# Patient Record
Sex: FEMALE | Race: WHITE | NOT HISPANIC OR LATINO
[De-identification: names, ages, dates, MRNs, and addresses within clinical notes are randomized per-mention and may not be internally consistent; named-entity substitution may affect disease eponyms.]

---

## 2019-09-23 ENCOUNTER — APPOINTMENT (OUTPATIENT)
Dept: PAIN MANAGEMENT | Facility: CLINIC | Age: 69
End: 2019-09-23
Payer: MEDICARE

## 2019-09-23 VITALS
HEIGHT: 63 IN | SYSTOLIC BLOOD PRESSURE: 130 MMHG | WEIGHT: 203 LBS | DIASTOLIC BLOOD PRESSURE: 70 MMHG | BODY MASS INDEX: 35.97 KG/M2

## 2019-09-23 DIAGNOSIS — Z86.59 PERSONAL HISTORY OF OTHER MENTAL AND BEHAVIORAL DISORDERS: ICD-10-CM

## 2019-09-23 DIAGNOSIS — M25.511 PAIN IN RIGHT SHOULDER: ICD-10-CM

## 2019-09-23 DIAGNOSIS — Z87.39 PERSONAL HISTORY OF OTHER DISEASES OF THE MUSCULOSKELETAL SYSTEM AND CONNECTIVE TISSUE: ICD-10-CM

## 2019-09-23 DIAGNOSIS — Z86.39 PERSONAL HISTORY OF OTHER ENDOCRINE, NUTRITIONAL AND METABOLIC DISEASE: ICD-10-CM

## 2019-09-23 DIAGNOSIS — B02.29 OTHER POSTHERPETIC NERVOUS SYSTEM INVOLVEMENT: ICD-10-CM

## 2019-09-23 DIAGNOSIS — Z86.69 PERSONAL HISTORY OF OTHER DISEASES OF THE NERVOUS SYSTEM AND SENSE ORGANS: ICD-10-CM

## 2019-09-23 DIAGNOSIS — M25.569 PAIN IN UNSPECIFIED KNEE: ICD-10-CM

## 2019-09-23 DIAGNOSIS — M25.512 PAIN IN RIGHT SHOULDER: ICD-10-CM

## 2019-09-23 DIAGNOSIS — Z86.79 PERSONAL HISTORY OF OTHER DISEASES OF THE CIRCULATORY SYSTEM: ICD-10-CM

## 2019-09-23 PROBLEM — Z00.00 ENCOUNTER FOR PREVENTIVE HEALTH EXAMINATION: Status: ACTIVE | Noted: 2019-09-23

## 2019-09-23 PROCEDURE — 99204 OFFICE O/P NEW MOD 45 MIN: CPT

## 2019-09-23 RX ORDER — ASPIRIN 81 MG
81 TABLET, DELAYED RELEASE (ENTERIC COATED) ORAL
Refills: 0 | Status: ACTIVE | COMMUNITY

## 2019-09-23 RX ORDER — ATORVASTATIN CALCIUM 40 MG/1
40 TABLET, FILM COATED ORAL
Refills: 0 | Status: ACTIVE | COMMUNITY

## 2019-09-23 RX ORDER — QUETIAPINE 50 MG/1
50 TABLET, FILM COATED ORAL
Refills: 0 | Status: ACTIVE | COMMUNITY

## 2019-09-23 NOTE — ASSESSMENT
[FreeTextEntry1] : 69 yof s/p psoas tumor removal (2015) and bilateral laminectomeis (2017, 2019) presents w/ low back pain. Given the severity of her low back and radicular pain, recommend MRI of the lumbar spine with and without contrast. Patient will return to review imaging and plan for potential intervention, likely epidural steroid injection. Recommend against long-term opioid management. Continue HEP.

## 2019-09-23 NOTE — REVIEW OF SYSTEMS
[Feeling Tired] : fatigue [Decrease Hearing] : decrease hearing [Recent Weight Gain (___ Lbs)] : recent ~M [unfilled] lbs weight gain [Cough] : cough [SOB at rest] : shortness of breath at rest [Wheezing] : wheezing [Urinary Frequency] : urinary frequency [Urinary Urgency] : urinary urgency [Joint Pain] : joint pain [Back Pain] : back pain [Joint Stiffness] : joint stiffness [Numbness] : numbness [Anxiety] : anxiety [Depression] : depression [Negative] : Heme/Lymph

## 2019-09-23 NOTE — CONSULT LETTER
[Dear  ___] : Dear  [unfilled], [Courtesy Letter:] : I had the pleasure of seeing your patient, [unfilled], in my office today. [Please see my note below.] : Please see my note below. [Consult Closing:] : Thank you very much for allowing me to participate in the care of this patient.  If you have any questions, please do not hesitate to contact me. [Sincerely,] : Sincerely, [FreeTextEntry3] : Shay Zamora MD\par \par

## 2019-09-23 NOTE — HISTORY OF PRESENT ILLNESS
[Back Pain] : back pain [Joint Pain] : joint  [3] : a minimum pain level of 3/10 [7] : a maximum pain level of 7/10 [Sharp] : sharp [Dull] : dull [Laying] : laying [Shooting] : shooting [Walking] : walking [Medications] : medications [6] : 3. What number best describes how, during the past week, pain has interfered with your general activity? 6/10 pain [FreeTextEntry1] : 69 yof presents w/ low back pain. Her pain began in 2015 when she had a psoas tumor removed in her right leg. Following this she developed pain down her left leg and underwent a laminectomy in 2017. She then underwent a right-sided laminectomy in January of 2019. Since that time, she has had severe low back pain. Pain is 7/10 in intensity. Pain is worse with activity. There is weakness, numbness, and tingling. Pain is dull and sharp. She typically uses tramadol and Norco for pain relief. Physical therapy has been beneficial prior. She also has right knee pain. No relief with steroid shots, however, viscosupplementation did help. [FreeTextEntry7] : lower back ,shulders [de-identified] : numbness [FreeTextEntry2] : 18

## 2019-09-23 NOTE — PHYSICAL EXAM
[de-identified] : Constitutional: Well-developed, in no acute distress\par Eyes: Pupil- Right: normal, Left: normal\par Neck exam: Inspection normal\par Respiratory: Normal effort, speaking in full sentences\par Cardiovascular: Regular rate and rhythm\par Vascular: Dorsal pedis pulses normal and equal bilaterally\par Abdomen: Inspection normal, no distension\par Skin: Inspection normal, no bruising noted\par Musculoskeletal:\par Lumbar Spine:   Gait: Antalgic\par 		Inspection: Normal curvature, no abnormal kyphosis or scoliosis\par 		Facet loading: pain bilaterally\par 		Palpation:\par 			Lumbar and paraspinal muscles: pain bilaterally\par 			Sacroiliac joint: no pain\par 			Greater trochanter: no pain\par 		Muscle Strength:\par 		Iliopsoas: 5/5 bilaterally\par 		Quadriceps: 5/5 bilaterally\par 		Hamstrings: 5/5 bilaterally\par 		Tibialis anterior: 5/5 bilaterally\par 		Extensor hallucis longus: 5/5 bilaterally\par \par 		Sensation: normal and equal in bilateral lower extremities\par \par 		Reflexes:\par 			Patellar reflex: 2+ bilaterally\par 			Ankle jerk reflex: 2+ bilaterally\par 		\par 		Straight leg raise: negative bilaterally\par \par Extremity: no edema noted\par Neurological: Memory normal, AAO x 3, Cranial nerves II - XII grossly normal\par Psychiatric: Appropriate mood and affect, oriented to time, place, person, and situation\par \par \par

## 2019-09-30 ENCOUNTER — RESULT REVIEW (OUTPATIENT)
Age: 69
End: 2019-09-30

## 2019-10-03 ENCOUNTER — RESULT REVIEW (OUTPATIENT)
Age: 69
End: 2019-10-03

## 2019-10-03 ENCOUNTER — APPOINTMENT (OUTPATIENT)
Dept: PAIN MANAGEMENT | Facility: CLINIC | Age: 69
End: 2019-10-03
Payer: MEDICARE

## 2019-10-03 VITALS
BODY MASS INDEX: 35.97 KG/M2 | WEIGHT: 203 LBS | HEIGHT: 63 IN | SYSTOLIC BLOOD PRESSURE: 130 MMHG | DIASTOLIC BLOOD PRESSURE: 70 MMHG

## 2019-10-03 DIAGNOSIS — M79.10 MYALGIA, UNSPECIFIED SITE: ICD-10-CM

## 2019-10-03 DIAGNOSIS — M47.817 SPONDYLOSIS W/OUT MYELOPATHY OR RADICULOPATHY, LUMBOSACRAL REGION: ICD-10-CM

## 2019-10-03 DIAGNOSIS — M54.16 RADICULOPATHY, LUMBAR REGION: ICD-10-CM

## 2019-10-03 PROCEDURE — 99214 OFFICE O/P EST MOD 30 MIN: CPT

## 2019-10-03 NOTE — PHYSICAL EXAM
[de-identified] : Constitutional: Well-developed, in no acute distress\par Eyes: Pupil- Right: normal, Left: normal\par Neck exam: Inspection normal\par Respiratory: Normal effort, speaking in full sentences\par Cardiovascular: Regular rate and rhythm\par Vascular: Dorsal pedis pulses normal and equal bilaterally\par Abdomen: Inspection normal, no distension\par Skin: Inspection normal, no bruising noted\par Musculoskeletal:\par Lumbar Spine:   Gait: Antalgic\par 		Inspection: Normal curvature, no abnormal kyphosis or scoliosis\par 		Facet loading: pain bilaterally\par 		Palpation:\par 			Lumbar and paraspinal muscles: pain bilaterally\par 			Sacroiliac joint: no pain\par 			Greater trochanter: no pain\par 		Muscle Strength:\par 		Iliopsoas: 5/5 bilaterally\par 		Quadriceps: 5/5 bilaterally\par 		Hamstrings: 5/5 bilaterally\par 		Tibialis anterior: 5/5 bilaterally\par 		Extensor hallucis longus: 5/5 bilaterally\par \par 		Sensation: normal and equal in bilateral lower extremities\par \par 		Reflexes:\par 			Patellar reflex: 2+ bilaterally\par 			Ankle jerk reflex: 2+ bilaterally\par 		\par 		Straight leg raise: negative bilaterally\par \par Extremity: no edema noted\par Neurological: Memory normal, AAO x 3, Cranial nerves II - XII grossly normal\par Psychiatric: Appropriate mood and affect, oriented to time, place, person, and situation\par \par \par

## 2019-10-03 NOTE — DATA REVIEWED
[No studies available for review at this time.] : No studies available for review at this time. [FreeTextEntry1] : MRI LUMBAR SPINE [facility] (10/01/19):\par \par \par  There have been prior laminectomies at L4-5 and L5-S1. There are postoperative changes in the\par dorsal soft tissues including a small fluid collection in the dorsal subcutaneous is fat measuring\par approximately 1.7 cm AP x 1.7 cm transverse x 10.9 cm craniocaudal and additional small fluid\par collection at the L4 laminectomy measuring 1.2 cm transverse by 1 AP x 1.9 cm craniocaudal. These\par are likely seromas, unless there is concern for infection.\par \par  The lumbar curvature and alignment is grossly maintained. The marrow signal is within normal limits\par with no focal marrow replacing lesion identified. The vertebral body heights are maintained and\par there is no evidence of acute fracture or subluxation. There is prominent endplate edema at L4-5\par with no abnormal signal within the intervertebral disc or adjacent paraspinal soft tissue edema,\par likely degenerative or postsurgical in etiology. Otherwise no abnormal vertebral edema is\par appreciated. The visualized anterior paraspinal soft tissues demonstrate no acute abnormalities. The\par right psoas muscle is atrophic. Renal cysts are noted.\par \par  The conus medullaris terminates at L1 and the thecal sac terminates at S2. No abnormal signal or\par enhancement is identified in the visualized spinal cord.\par \par  The visualized lower thoracic spine at the level of T10-11, there is prominent facet hypertrophy\par resulting in mild central canal stenosis and encroachment upon the dorsal spinal cord.\par \par  T12-L1: No significant disc bulge or herniation. No significant central canal, subarticular, or\par foraminal stenosis.\par \par  L1-2: Mild disc bulge and mild facet/ligamentous hypertrophy. No significant central canal,\par subarticular, or foraminal stenosis.\par \par  L2-3: Mild disc bulge and mild facet/ligamentous hypertrophy. No significant central canal or\par subarticular stenosis. Mild/moderate left foraminal stenosis and no significant right foraminal\par stenosis.\par \par  L3-4: Mild disc bulge mild facet/ligamentous hypertrophy. No significant central canal,\par subarticular, or foraminal stenosis. Just lateral to the right neural foramen, there are small\par indeterminate structures which demonstrates susceptibility artifact suggesting some component of\par metal, possibly surgical clips and adjacent scar tissue related to the patient's history of\par schwannoma resection. The right psoas muscle in this region is atrophic. Correlate with surgical\par history.\par \par  L4-5: Status post laminectomy. Central disc extrusion superimposed on a mild disc bulge. Moderate\par facet hypertrophy. Severe central canal and bilateral subarticular stenosis with impingement of the\par cauda equina. Moderate/severe bilateral foraminal stenosis.\par \par  L5-S1: Status post laminectomy. Complete loss of disc space with apparent partial degenerative\par fusion of L5-S1. Mild circumferential disc/osteophyte and mild facet arthropathy. No significant\par central canal stenosis. Moderate right subarticular stenosis and mild left subarticular stenosis.\par Moderate bilateral foraminal stenosis.\par \par \par \par  IMPRESSION:\par \par  Status post prior laminectomy at L4-5 and L5-S1. Postoperative changes in the dorsal soft tissues\par including 2 small fluid collections which are compatible with seromas, unless there is clinical\par concern for infection. Prominent endplate edema at L4-5 has an appearance suggestive of degenerative\par or postoperative etiology, however the possibility of an early osteomyelitis cannot be completely\par excluded.\par \par  At L4-5, a central disc herniation results in severe central canal stenosis and impingement of the\par cauda equina. No other significant central canal stenosis. Varying degrees of foraminal stenosis as\par above.\par \par  Just lateral to the right L3-4 neural foramen, there are small indeterminate structures with signal\par characteristics suggesting a component of metal. The right psoas muscle is completely atrophic.\par Finding may be related to the history of prior schwannoma resection or other surgery. Correlation\par with surgical history and prior outside imaging is recommended.\par \par

## 2019-10-03 NOTE — REVIEW OF SYSTEMS
[Feeling Tired] : fatigue [Recent Weight Gain (___ Lbs)] : recent ~M [unfilled] lbs weight gain [Decrease Hearing] : decrease hearing [SOB at rest] : shortness of breath at rest [Cough] : cough [Wheezing] : wheezing [Urinary Frequency] : urinary frequency [Urinary Urgency] : urinary urgency [Back Pain] : back pain [Joint Pain] : joint pain [Joint Stiffness] : joint stiffness [Numbness] : numbness [Anxiety] : anxiety [Depression] : depression [Negative] : Heme/Lymph

## 2019-10-03 NOTE — ASSESSMENT
[FreeTextEntry1] : 69 yof s/p psoas tumor removal (2015) and bilateral laminectomies (2017, 2019) presents w/ low back pain. I have personally reviewed the patient's MRI in detail and discussed it with them which is significant for severe canal stenosis at L4-L5. Given incidental findings, x-ray lumbar spine to rule out metal. Refer to spine surgery for evaluation, given severity of symptoms. If not considered a surgical candidate, consider MEMO. Start cyclobenzaprine 5 mg TID prn for pain. Continue gabapentin 1200 mg TID, which she has been on for years.\par

## 2019-10-03 NOTE — HISTORY OF PRESENT ILLNESS
[Back Pain] : back pain [Joint Pain] : joint  [3] : a minimum pain level of 3/10 [7] : a maximum pain level of 7/10 [Sharp] : sharp [Dull] : dull [Shooting] : shooting [Laying] : laying [Walking] : walking [Medications] : medications [6] : 3. What number best describes how, during the past week, pain has interfered with your general activity? 6/10 pain [FreeTextEntry1] : As per my note dated 09/23/19: "69 yof presents w/ low back pain. Her pain began in 2015 when she had a psoas tumor removed in her right leg. Following this she developed pain down her left leg and underwent a laminectomy in 2017. She then underwent a right-sided laminectomy in January of 2019. Since that time, she has had severe low back pain. Pain is 7/10 in intensity. Pain is worse with activity. There is weakness, numbness, and tingling. Pain is dull and sharp. She typically uses tramadol and Norco for pain relief. Physical therapy has been beneficial prior. She also has right knee pain. No relief with steroid shots, however, viscosupplementation did help."\par \par Pt returns for follow-up today, 10/03/19. She returns to review recent MRI of the lumbar spine. Pain remains severe. Pain is in the back and radiates into the legs. Quality of life remains impaired. [FreeTextEntry7] : lower back ,shulders [de-identified] : numbness [FreeTextEntry2] : 18

## 2019-11-26 ENCOUNTER — APPOINTMENT (OUTPATIENT)
Dept: HEART AND VASCULAR | Facility: CLINIC | Age: 69
End: 2019-11-26
Payer: MEDICARE

## 2019-11-26 ENCOUNTER — NON-APPOINTMENT (OUTPATIENT)
Age: 69
End: 2019-11-26

## 2019-11-26 VITALS
SYSTOLIC BLOOD PRESSURE: 122 MMHG | RESPIRATION RATE: 16 BRPM | DIASTOLIC BLOOD PRESSURE: 70 MMHG | BODY MASS INDEX: 34.38 KG/M2 | HEIGHT: 63 IN | HEART RATE: 60 BPM | WEIGHT: 194 LBS

## 2019-11-26 DIAGNOSIS — I25.2 OLD MYOCARDIAL INFARCTION: ICD-10-CM

## 2019-11-26 PROCEDURE — 93000 ELECTROCARDIOGRAM COMPLETE: CPT

## 2019-11-26 PROCEDURE — 99204 OFFICE O/P NEW MOD 45 MIN: CPT

## 2019-11-26 RX ORDER — ALBUTEROL 90 MCG
AEROSOL (GRAM) INHALATION
Refills: 0 | Status: DISCONTINUED | COMMUNITY
End: 2019-11-26

## 2019-11-26 RX ORDER — LORATADINE 10 MG/1
10 CAPSULE, LIQUID FILLED ORAL
Refills: 0 | Status: DISCONTINUED | COMMUNITY
End: 2019-11-26

## 2019-11-26 RX ORDER — METOPROLOL TARTRATE 50 MG/1
50 TABLET, FILM COATED ORAL
Qty: 180 | Refills: 3 | Status: ACTIVE | COMMUNITY

## 2019-11-26 RX ORDER — LISINOPRIL 40 MG/1
40 TABLET ORAL
Refills: 0 | Status: ACTIVE | COMMUNITY

## 2019-11-26 RX ORDER — NITROGLYCERIN 400 UG/1
SPRAY ORAL
Refills: 0 | Status: DISCONTINUED | COMMUNITY
End: 2019-11-26

## 2019-11-26 RX ORDER — FLUTICASONE PROPIONATE 50 UG/1
50 SPRAY, METERED NASAL
Refills: 0 | Status: ACTIVE | COMMUNITY

## 2019-11-26 RX ORDER — OXYCODONE HYDROCHLORIDE AND ACETAMINOPHEN 5; 325 MG/1; MG/1
5-325 TABLET ORAL
Refills: 0 | Status: DISCONTINUED | COMMUNITY
End: 2019-11-26

## 2019-11-26 RX ORDER — IBUPROFEN 200 MG/1
CAPSULE, LIQUID FILLED ORAL
Refills: 0 | Status: DISCONTINUED | COMMUNITY
End: 2019-11-26

## 2019-11-26 RX ORDER — CYCLOBENZAPRINE HYDROCHLORIDE 5 MG/1
5 TABLET, FILM COATED ORAL 3 TIMES DAILY
Qty: 90 | Refills: 1 | Status: DISCONTINUED | COMMUNITY
Start: 2019-10-03 | End: 2019-11-26

## 2019-11-26 NOTE — PHYSICAL EXAM
[General Appearance - Well Developed] : well developed [Well Groomed] : well groomed [Normal Appearance] : normal appearance [No Deformities] : no deformities [General Appearance - Well Nourished] : well nourished [General Appearance - In No Acute Distress] : no acute distress [No Oral Pallor] : no oral pallor [Normal Oral Mucosa] : normal oral mucosa [Heart Sounds] : normal S1 and S2 [Heart Rate And Rhythm] : heart rate and rhythm were normal [No Oral Cyanosis] : no oral cyanosis [Murmurs] : no murmurs present [Auscultation Breath Sounds / Voice Sounds] : lungs were clear to auscultation bilaterally [Exaggerated Use Of Accessory Muscles For Inspiration] : no accessory muscle use [Respiration, Rhythm And Depth] : normal respiratory rhythm and effort [Abdomen Tenderness] : non-tender [Abdomen Soft] : soft [Nail Clubbing] : no clubbing of the fingernails [Cyanosis, Localized] : no localized cyanosis [Abdomen Mass (___ Cm)] : no abdominal mass palpated [Oriented To Time, Place, And Person] : oriented to person, place, and time [Affect] : the affect was normal [] : no ischemic changes [Petechial Hemorrhages (___cm)] : no petechial hemorrhages [No Anxiety] : not feeling anxious [Mood] : the mood was normal

## 2019-11-26 NOTE — REASON FOR VISIT
[Initial Evaluation] : an initial evaluation of [FreeTextEntry1] : 69 year old F with history of HTN, HL, history of benign Schwannoma, reported history of MI (2012) as per patient with 4 stents in [place], has not seen cardiologist since 2017. She just relocated to [place] from [place] and here to establish cardiology care. She has been having shoulder pain associated with hot flashes that occur roughly once a month. She denies dyspnea, orthopnea, PND, edema. She walks with a walker. Has not had stress testing/echocardiogram in many years. Recent bloodwork done within the last 3 months. \par \par EKG today NSR at 63 bpm without significant STT abnormalities. \par \par

## 2019-11-26 NOTE — DISCUSSION/SUMMARY
[Hyperlipidemia] : hyperlipidemia [Diet Modification] : diet modification [Hypertension] : hypertension [Exercise] : exercise [Responding to Treatment] : responding to treatment [Stable] : stable [Sodium Restriction] : sodium restriction [de-identified] : history of PCI in 2012 post MI.  [de-identified] : Dobutamine Stress Echo, as patient has chronic back pain and unable to walk.  [de-identified] : on Atorvastatin 40 mg PO daily [de-identified] : on Amlodipine 5 mg PO daily and Lisinopril 40 mg PO daily [FreeTextEntry4] : Will obtain records from [place] - Dr. Linares. Will obtain recent labs from within the past 3 months done at [facility].  [FreeTextEntry3] : after testing completed

## 2019-12-31 ENCOUNTER — APPOINTMENT (OUTPATIENT)
Dept: HEART AND VASCULAR | Facility: CLINIC | Age: 69
End: 2019-12-31
Payer: MEDICARE

## 2019-12-31 VITALS
SYSTOLIC BLOOD PRESSURE: 108 MMHG | BODY MASS INDEX: 34.38 KG/M2 | DIASTOLIC BLOOD PRESSURE: 60 MMHG | WEIGHT: 194 LBS | HEIGHT: 63 IN | HEART RATE: 66 BPM

## 2019-12-31 PROCEDURE — 93325 DOPPLER ECHO COLOR FLOW MAPG: CPT

## 2019-12-31 PROCEDURE — 93320 DOPPLER ECHO COMPLETE: CPT

## 2019-12-31 PROCEDURE — 93351 STRESS TTE COMPLETE: CPT

## 2020-01-02 ENCOUNTER — RESULT REVIEW (OUTPATIENT)
Age: 70
End: 2020-01-02

## 2020-01-14 ENCOUNTER — APPOINTMENT (OUTPATIENT)
Dept: HEART AND VASCULAR | Facility: CLINIC | Age: 70
End: 2020-01-14
Payer: MEDICARE

## 2020-01-14 VITALS
HEIGHT: 63 IN | RESPIRATION RATE: 14 BRPM | BODY MASS INDEX: 30.83 KG/M2 | SYSTOLIC BLOOD PRESSURE: 114 MMHG | DIASTOLIC BLOOD PRESSURE: 70 MMHG | HEART RATE: 68 BPM | WEIGHT: 174 LBS

## 2020-01-14 PROCEDURE — 99214 OFFICE O/P EST MOD 30 MIN: CPT

## 2020-01-14 NOTE — DISCUSSION/SUMMARY
[Hyperlipidemia] : hyperlipidemia [Diet Modification] : diet modification [Exercise] : exercise [Hypertension] : hypertension [Stable] : stable [Sodium Restriction] : sodium restriction [Responding to Treatment] : responding to treatment [___ Week(s)] : [unfilled] week(s) [de-identified] : history of PCI in 2012 post MI.  [de-identified] : Dobutamine Stress Echo, as patient has chronic back pain and unable to walk.  [de-identified] : on Atorvastatin 40 mg PO daily [FreeTextEntry4] : check labs today [de-identified] : on Amlodipine 5 mg PO daily and Lisinopril 40 mg PO daily. To check BP log at home and see if we can adjust meds

## 2020-01-14 NOTE — REASON FOR VISIT
[Initial Evaluation] : an initial evaluation of [FreeTextEntry1] : 69 year old F with history of HTN, HL, history of benign Schwannoma, reported history of MI (2012) as per patient with 4 stents in [place] here for followup after recent testing. She denies chest pain, dyspnea, orthopnea, PND, edema. She has chronic LBP. She has lost 20 lbs and inquires about lowering BP meds. \par \par DSE 12/31/2019: No EKG changes, Occasional PVCs, Normal stress echo\par Resting echo Trace MR; LVEF 55%; Trace TR, PASP 10 mm Hg. Mild cLVH (1.4 cm). \par \par EKG 12/2019 NSR at 63 bpm without significant STT abnormalities. \par \par

## 2020-01-14 NOTE — PHYSICAL EXAM
[General Appearance - Well Developed] : well developed [Normal Appearance] : normal appearance [Well Groomed] : well groomed [General Appearance - Well Nourished] : well nourished [No Deformities] : no deformities [General Appearance - In No Acute Distress] : no acute distress [Normal Oral Mucosa] : normal oral mucosa [No Oral Cyanosis] : no oral cyanosis [No Oral Pallor] : no oral pallor [Respiration, Rhythm And Depth] : normal respiratory rhythm and effort [Exaggerated Use Of Accessory Muscles For Inspiration] : no accessory muscle use [Auscultation Breath Sounds / Voice Sounds] : lungs were clear to auscultation bilaterally [Heart Rate And Rhythm] : heart rate and rhythm were normal [Heart Sounds] : normal S1 and S2 [Murmurs] : no murmurs present [Abdomen Soft] : soft [Abdomen Tenderness] : non-tender [Abdomen Mass (___ Cm)] : no abdominal mass palpated [Nail Clubbing] : no clubbing of the fingernails [Cyanosis, Localized] : no localized cyanosis [Oriented To Time, Place, And Person] : oriented to person, place, and time [Petechial Hemorrhages (___cm)] : no petechial hemorrhages [] : no ischemic changes [Mood] : the mood was normal [Affect] : the affect was normal [No Anxiety] : not feeling anxious

## 2020-01-15 ENCOUNTER — RESULT REVIEW (OUTPATIENT)
Age: 70
End: 2020-01-15

## 2020-01-15 LAB
ALBUMIN SERPL ELPH-MCNC: 4 G/DL
ALP BLD-CCNC: 119 U/L
ALT SERPL-CCNC: 15 U/L
ANION GAP SERPL CALC-SCNC: 13 MMOL/L
AST SERPL-CCNC: 27 U/L
BASOPHILS # BLD AUTO: 0.06 K/UL
BASOPHILS NFR BLD AUTO: 0.6 %
BILIRUB SERPL-MCNC: 0.3 MG/DL
BUN SERPL-MCNC: 22 MG/DL
CALCIUM SERPL-MCNC: 10.1 MG/DL
CHLORIDE SERPL-SCNC: 108 MMOL/L
CHOLEST SERPL-MCNC: 178 MG/DL
CHOLEST/HDLC SERPL: 4.7 RATIO
CO2 SERPL-SCNC: 21 MMOL/L
CREAT SERPL-MCNC: 0.86 MG/DL
EOSINOPHIL # BLD AUTO: 0.39 K/UL
EOSINOPHIL NFR BLD AUTO: 4 %
GLUCOSE SERPL-MCNC: 103 MG/DL
HCT VFR BLD CALC: 43.3 %
HDLC SERPL-MCNC: 38 MG/DL
HGB BLD-MCNC: 13 G/DL
IMM GRANULOCYTES NFR BLD AUTO: 0.3 %
LDLC SERPL CALC-MCNC: 109 MG/DL
LYMPHOCYTES # BLD AUTO: 2 K/UL
LYMPHOCYTES NFR BLD AUTO: 20.6 %
MAN DIFF?: NORMAL
MCHC RBC-ENTMCNC: 30 GM/DL
MCHC RBC-ENTMCNC: 30.2 PG
MCV RBC AUTO: 100.7 FL
MONOCYTES # BLD AUTO: 1.16 K/UL
MONOCYTES NFR BLD AUTO: 11.9 %
NEUTROPHILS # BLD AUTO: 6.07 K/UL
NEUTROPHILS NFR BLD AUTO: 62.6 %
PLATELET # BLD AUTO: 248 K/UL
POTASSIUM SERPL-SCNC: 4.5 MMOL/L
PROT SERPL-MCNC: 7.5 G/DL
RBC # BLD: 4.3 M/UL
RBC # FLD: 13.9 %
SODIUM SERPL-SCNC: 142 MMOL/L
TRIGL SERPL-MCNC: 153 MG/DL
WBC # FLD AUTO: 9.71 K/UL

## 2020-02-11 ENCOUNTER — APPOINTMENT (OUTPATIENT)
Dept: HEART AND VASCULAR | Facility: CLINIC | Age: 70
End: 2020-02-11
Payer: MEDICARE

## 2020-02-11 VITALS
DIASTOLIC BLOOD PRESSURE: 78 MMHG | BODY MASS INDEX: 32.96 KG/M2 | WEIGHT: 186 LBS | RESPIRATION RATE: 14 BRPM | HEART RATE: 62 BPM | SYSTOLIC BLOOD PRESSURE: 130 MMHG | HEIGHT: 63 IN

## 2020-02-11 DIAGNOSIS — I10 ESSENTIAL (PRIMARY) HYPERTENSION: ICD-10-CM

## 2020-02-11 DIAGNOSIS — R73.03 PREDIABETES.: ICD-10-CM

## 2020-02-11 PROCEDURE — 99214 OFFICE O/P EST MOD 30 MIN: CPT

## 2020-02-11 NOTE — REASON FOR VISIT
[Initial Evaluation] : an initial evaluation of [FreeTextEntry1] : 69 year old F with history of HTN, HL, history of benign Schwannoma, prediabetes reported history of MI (2012) as per patient with 4 stents in [place] here for followup. She denies chest pain, dyspnea, orthopnea, PND, edema. BP readings at home reviewed. \par \par Labs today: ; ; HDL 38; Total chol 178; CMP and CBC WNL\par \par DSE 12/31/2019: No EKG changes, Occasional PVCs, Normal stress echo\par Resting echo Trace MR; LVEF 55%; Trace TR, PASP 10 mm Hg. Mild cLVH (1.4 cm). \par \par EKG 12/2019 NSR at 63 bpm without significant STT abnormalities. \par \par

## 2020-02-11 NOTE — PHYSICAL EXAM
[General Appearance - Well Developed] : well developed [Normal Appearance] : normal appearance [Well Groomed] : well groomed [No Deformities] : no deformities [General Appearance - Well Nourished] : well nourished [General Appearance - In No Acute Distress] : no acute distress [Normal Oral Mucosa] : normal oral mucosa [No Oral Pallor] : no oral pallor [No Oral Cyanosis] : no oral cyanosis [Respiration, Rhythm And Depth] : normal respiratory rhythm and effort [Exaggerated Use Of Accessory Muscles For Inspiration] : no accessory muscle use [Auscultation Breath Sounds / Voice Sounds] : lungs were clear to auscultation bilaterally [Heart Rate And Rhythm] : heart rate and rhythm were normal [Murmurs] : no murmurs present [Heart Sounds] : normal S1 and S2 [Abdomen Tenderness] : non-tender [Abdomen Soft] : soft [Abdomen Mass (___ Cm)] : no abdominal mass palpated [Nail Clubbing] : no clubbing of the fingernails [Cyanosis, Localized] : no localized cyanosis [] : no ischemic changes [Petechial Hemorrhages (___cm)] : no petechial hemorrhages [Oriented To Time, Place, And Person] : oriented to person, place, and time [Affect] : the affect was normal [Mood] : the mood was normal [No Anxiety] : not feeling anxious

## 2020-02-11 NOTE — DISCUSSION/SUMMARY
[Hyperlipidemia] : hyperlipidemia [Diet Modification] : diet modification [Exercise] : exercise [Hypertension] : hypertension [Stable] : stable [Responding to Treatment] : responding to treatment [Sodium Restriction] : sodium restriction [___ Month(s)] : [unfilled] month(s) [de-identified] : history of PCI in 2012 post MI. Normal stress [de-identified] : Continue on Amlodipine 5 mg PO daily and Lisinopril 40 mg PO daily.  [de-identified] : on Atorvastatin 40 mg PO daily

## 2020-02-12 LAB
ESTIMATED AVERAGE GLUCOSE: 108 MG/DL
HBA1C MFR BLD HPLC: 5.4 %

## 2020-08-11 ENCOUNTER — APPOINTMENT (OUTPATIENT)
Dept: HEART AND VASCULAR | Facility: CLINIC | Age: 70
End: 2020-08-11
Payer: MEDICARE

## 2020-08-11 VITALS
BODY MASS INDEX: 32.6 KG/M2 | OXYGEN SATURATION: 95 % | DIASTOLIC BLOOD PRESSURE: 86 MMHG | SYSTOLIC BLOOD PRESSURE: 149 MMHG | HEIGHT: 63 IN | HEART RATE: 64 BPM | WEIGHT: 184 LBS | RESPIRATION RATE: 20 BRPM | TEMPERATURE: 98.1 F

## 2020-08-11 PROCEDURE — 99214 OFFICE O/P EST MOD 30 MIN: CPT

## 2020-08-11 RX ORDER — CITALOPRAM HYDROBROMIDE 20 MG/1
20 TABLET, FILM COATED ORAL
Refills: 0 | Status: ACTIVE | COMMUNITY

## 2020-08-11 RX ORDER — CITALOPRAM HYDROBROMIDE 40 MG/1
40 TABLET, FILM COATED ORAL DAILY
Refills: 0 | Status: DISCONTINUED | COMMUNITY
End: 2020-08-11

## 2020-08-11 RX ORDER — NITROGLYCERIN 0.4 MG/1
0.4 TABLET SUBLINGUAL
Qty: 30 | Refills: 1 | Status: ACTIVE | COMMUNITY
Start: 1900-01-01 | End: 1900-01-01

## 2020-08-11 NOTE — DISCUSSION/SUMMARY
[Hyperlipidemia] : hyperlipidemia [Diet Modification] : diet modification [Stable] : stable [Exercise] : exercise [Hypertension] : hypertension [Sodium Restriction] : sodium restriction [Holter Monitor] : a Holter monitor [de-identified] : history of PCI in 2012 post MI. Normal stress [de-identified] : on Atorvastatin 40 mg PO daily [de-identified] : elevated today [de-identified] : 2 weeks [FreeTextEntry3] : after testing completed.  [de-identified] : Continue on Amlodipine 5 mg PO daily and Lisinopril 40 mg PO daily. To keep BP log. If still elevated, will consider increasing Amlodipine to 10 mg PO daily

## 2020-08-11 NOTE — PHYSICAL EXAM
[Normal Appearance] : normal appearance [General Appearance - Well Developed] : well developed [General Appearance - Well Nourished] : well nourished [Well Groomed] : well groomed [No Deformities] : no deformities [General Appearance - In No Acute Distress] : no acute distress [No Oral Pallor] : no oral pallor [Normal Oral Mucosa] : normal oral mucosa [No Oral Cyanosis] : no oral cyanosis [Auscultation Breath Sounds / Voice Sounds] : lungs were clear to auscultation bilaterally [Respiration, Rhythm And Depth] : normal respiratory rhythm and effort [Exaggerated Use Of Accessory Muscles For Inspiration] : no accessory muscle use [Heart Rate And Rhythm] : heart rate and rhythm were normal [Heart Sounds] : normal S1 and S2 [Murmurs] : no murmurs present [Abdomen Soft] : soft [Abdomen Tenderness] : non-tender [Abdomen Mass (___ Cm)] : no abdominal mass palpated [] : no ischemic changes [Cyanosis, Localized] : no localized cyanosis [Petechial Hemorrhages (___cm)] : no petechial hemorrhages [Nail Clubbing] : no clubbing of the fingernails [Oriented To Time, Place, And Person] : oriented to person, place, and time [Affect] : the affect was normal [Mood] : the mood was normal [No Anxiety] : not feeling anxious

## 2020-08-11 NOTE — REASON FOR VISIT
[Initial Evaluation] : an initial evaluation of [FreeTextEntry1] : 70 year old F with history of HTN, HL, history of benign Schwannoma, prediabetes reported history of MI (2012) as per patient with 4 stents in [place] here for followup. Her BP is elevated today. She has not been keeping a BP log. Over the past 4 weeks, she has had two episodes of palpitations, that was accompanied by some uneasy feeling in the stomach and diaphoresis. The first episode subsided after having a BM. The second episode was after drinking a glass of champagne, and lasted approximately 30 minutes. She denies chest pain. However, has occasional shoulder pain (not with palpitations) and uneasy stomach feeling and states usually subsides with a BM. \par \par Labs 01/15/2020: ; ; HDL 38; Total chol 178; CMP and CBC WNL\par \par DSE 12/31/2019: No EKG changes, Occasional PVCs, Normal stress echo\par Resting echo Trace MR; LVEF 55%; Trace TR, PASP 10 mm Hg. Mild cLVH (1.4 cm). \par \par EKG 12/2019 NSR at 63 bpm without significant STT abnormalities. \par \par

## 2020-08-21 ENCOUNTER — APPOINTMENT (OUTPATIENT)
Dept: HEART AND VASCULAR | Facility: CLINIC | Age: 70
End: 2020-08-21
Payer: MEDICARE

## 2020-08-21 PROCEDURE — 0298T: CPT

## 2020-08-21 PROCEDURE — 0296T: CPT

## 2020-09-01 ENCOUNTER — APPOINTMENT (OUTPATIENT)
Dept: HEART AND VASCULAR | Facility: CLINIC | Age: 70
End: 2020-09-01

## 2020-10-27 ENCOUNTER — APPOINTMENT (OUTPATIENT)
Dept: HEART AND VASCULAR | Facility: CLINIC | Age: 70
End: 2020-10-27
Payer: MEDICARE

## 2020-10-27 ENCOUNTER — NON-APPOINTMENT (OUTPATIENT)
Age: 70
End: 2020-10-27

## 2020-10-27 VITALS
SYSTOLIC BLOOD PRESSURE: 109 MMHG | BODY MASS INDEX: 32.96 KG/M2 | OXYGEN SATURATION: 98 % | HEART RATE: 76 BPM | RESPIRATION RATE: 16 BRPM | DIASTOLIC BLOOD PRESSURE: 66 MMHG | WEIGHT: 186 LBS | HEIGHT: 63 IN | TEMPERATURE: 97.7 F

## 2020-10-27 DIAGNOSIS — R42 DIZZINESS AND GIDDINESS: ICD-10-CM

## 2020-10-27 DIAGNOSIS — R00.2 PALPITATIONS: ICD-10-CM

## 2020-10-27 PROCEDURE — 99214 OFFICE O/P EST MOD 30 MIN: CPT

## 2020-10-27 PROCEDURE — 0298T: CPT

## 2020-10-27 PROCEDURE — 0296T: CPT

## 2020-10-27 RX ORDER — AMLODIPINE BESYLATE 10 MG/1
10 TABLET ORAL
Refills: 0 | Status: ACTIVE | COMMUNITY

## 2020-10-27 RX ORDER — TRAZODONE HYDROCHLORIDE 50 MG/1
50 TABLET ORAL
Refills: 0 | Status: ACTIVE | COMMUNITY

## 2020-10-27 RX ORDER — AMLODIPINE BESYLATE 5 MG/1
5 TABLET ORAL
Refills: 0 | Status: DISCONTINUED | COMMUNITY
End: 2020-10-27

## 2020-10-27 NOTE — DISCUSSION/SUMMARY
[Hyperlipidemia] : hyperlipidemia [Stable] : stable [Diet Modification] : diet modification [Exercise] : exercise [Hypertension] : hypertension [Sodium Restriction] : sodium restriction [Holter Monitor] : a Holter monitor [de-identified] : history of PCI in 2012 post MI. Normal stress [de-identified] : on Atorvastatin 40 mg PO daily [de-identified] : normal [de-identified] : Amlodipine increased by PMD to 10 mg PO daily with better BP readings. c/w Lisinopril 40 mg PO daily. To keep BP log. If still elevated, will consider increasing Amlodipine to 10 mg PO daily [de-identified] : 2 weeks (Ziopatch placed today).  [FreeTextEntry4] : Will consider CCTA for further evaluation given diaphoresis, shoulder pain, dizziness and history of CAD.  [FreeTextEntry3] : after testing completed.

## 2020-10-27 NOTE — REASON FOR VISIT
[Initial Evaluation] : an initial evaluation of [FreeTextEntry1] : 70 year old F with history of HTN, HL, history of benign Schwannoma, prediabetes reported history of MI (2012) as per patient with 4 stents in [place] here for followup. She returns with complaints of  two more episodes of palpitations, that was accompanied by some uneasy feeling i and diaphoresis. 10/18 and 1023 - first one was 30 minutes long and the 10/23 episode was 4 hours. She admits to not drinking enough water. She also states she pushed herself to run errands. Holter from August reviewed with her, however she states she did not have palpitations at that time. Symptoms accompanied with hot feeling throughout body and shoulder pain. \par \par I have discussed with her at length importance of increasing water. Also given she is having more episodes of dizziness and palpitations, we will repeat Telemetry monitoring. I  have asked her to keep BP log as she has been. Of note, some changes to her Neuro/psych meds have been made. Cymbalta was started in place of Gabapentin  and Citalopram was decreased. EKG done today shows SR with normal QTC (<400 msec). \par \par Holter 2 week - Rare PVC/APC, Average HR 66 bpm SR. 9 beat SVT run - 1 time. \par Labs 01/15/2020: ; ; HDL 38; Total chol 178; CMP and CBC WNL\par \par DSE 12/31/2019: No EKG changes, Occasional PVCs, Normal stress echo\par Resting echo Trace MR; LVEF 55%; Trace TR, PASP 10 mm Hg. Mild cLVH (1.4 cm). \par \par EKG 12/2019 NSR at 63 bpm without significant STT abnormalities. \par \par

## 2020-10-28 PROBLEM — R00.2 PALPITATIONS: Status: ACTIVE | Noted: 2020-10-28

## 2020-10-28 PROBLEM — R42 DIZZINESS: Status: ACTIVE | Noted: 2020-10-28

## 2020-11-03 ENCOUNTER — APPOINTMENT (OUTPATIENT)
Dept: HEART AND VASCULAR | Facility: CLINIC | Age: 70
End: 2020-11-03
Payer: MEDICARE

## 2020-11-03 PROCEDURE — 36415 COLL VENOUS BLD VENIPUNCTURE: CPT

## 2020-12-17 ENCOUNTER — NON-APPOINTMENT (OUTPATIENT)
Age: 70
End: 2020-12-17

## 2020-12-28 ENCOUNTER — NON-APPOINTMENT (OUTPATIENT)
Age: 70
End: 2020-12-28

## 2021-01-25 ENCOUNTER — NON-APPOINTMENT (OUTPATIENT)
Age: 71
End: 2021-01-25

## 2021-10-06 PROBLEM — I10 ESSENTIAL HYPERTENSION: Status: ACTIVE | Noted: 2020-02-11

## 2021-10-26 ENCOUNTER — NON-APPOINTMENT (OUTPATIENT)
Age: 71
End: 2021-10-26

## 2021-10-26 ENCOUNTER — APPOINTMENT (OUTPATIENT)
Dept: HEART AND VASCULAR | Facility: CLINIC | Age: 71
End: 2021-10-26
Payer: MEDICARE

## 2021-10-26 VITALS
HEART RATE: 76 BPM | OXYGEN SATURATION: 99 % | RESPIRATION RATE: 16 BRPM | DIASTOLIC BLOOD PRESSURE: 56 MMHG | SYSTOLIC BLOOD PRESSURE: 116 MMHG | TEMPERATURE: 97.4 F

## 2021-10-26 PROCEDURE — 93000 ELECTROCARDIOGRAM COMPLETE: CPT

## 2021-10-26 PROCEDURE — 99214 OFFICE O/P EST MOD 30 MIN: CPT

## 2021-10-26 RX ORDER — DULOXETINE HYDROCHLORIDE 30 MG/1
30 CAPSULE, DELAYED RELEASE PELLETS ORAL AT BEDTIME
Refills: 0 | Status: DISCONTINUED | COMMUNITY
End: 2021-10-26

## 2021-10-26 RX ORDER — CYCLOBENZAPRINE HYDROCHLORIDE 5 MG/1
5 TABLET, FILM COATED ORAL 3 TIMES DAILY
Qty: 90 | Refills: 1 | Status: DISCONTINUED | COMMUNITY
Start: 2019-10-03 | End: 2021-10-26

## 2021-10-26 RX ORDER — DICLOFENAC SODIUM 75 MG/1
75 TABLET, DELAYED RELEASE ORAL
Refills: 0 | Status: DISCONTINUED | COMMUNITY
End: 2021-10-26

## 2021-10-26 NOTE — DISCUSSION/SUMMARY
[Hyperlipidemia] : hyperlipidemia [Diet Modification] : diet modification [Exercise] : exercise [Hypertension] : hypertension [Coronary Artery Disease] : coronary artery disease [Stable] : stable [None] : There are no changes in medication management [Responding to Treatment] : responding to treatment [___ Month(s)] : in [unfilled] month(s) [de-identified] : c/w ASA and Atorvastatin [de-identified] : normal [de-identified] : resolved [FreeTextEntry1] : She is optimized from a [facility] standpoint for Gall bladder surgery. May proceed without further cardiac workup. Ideally, would like to continue ASA perioperatively if deemed okay by surgery, given history of stents in the past.

## 2021-10-26 NOTE — REASON FOR VISIT
[Follow-Up - Clinic] : a clinic follow-up of [FreeTextEntry1] : 71 year old F with history of HTN, HL, history of benign Schwannoma, prediabetes reported history of MI (2012) as per patient with 4 stents in [place] here for followup. Since last visit, she had Covid in January 2021. She recovered. Vaccinated in March 2021. Since past 2 months, she had cholecystitis requiring hospitalization at [facility]. She is currently in rehab. She is scheduled for gall bladder surgery in the next few weeks. She is here for preoperative cardiac risk assessment. She denies chest pain, dyspnea, orthopnea, PND, edema. \par \par EKG today SR at 72 bpm. without STT abnormalities. \par \par Holter Oct/Nov 2020: SR at average HR of 69 bpm, Rare PVC/APC. One episode of sinus tachycardia at 11:09 pm on 11/4/2020\par \par EKG 10/24/2020 shows SR with normal QTC (<400 msec). \par \par Holter 2 week - Rare PVC/APC, Average HR 66 bpm SR. 9 beat SVT run - 1 time. \par Labs 01/15/2020: ; ; HDL 38; Total chol 178; CMP and CBC WNL\par \par DSE 12/31/2019: No EKG changes, Occasional PVCs, Normal stress echo\par Resting echo Trace MR; LVEF 55%; Trace TR, PASP 10 mm Hg. Mild cLVH (1.4 cm). \par \par EKG 12/2019 NSR at 63 bpm without significant STT abnormalities. \par \par

## 2022-04-05 ENCOUNTER — APPOINTMENT (OUTPATIENT)
Dept: HEART AND VASCULAR | Facility: CLINIC | Age: 72
End: 2022-04-05

## 2024-04-12 RX ORDER — CHLORHEXIDINE GLUCONATE 4 %
325 (65 FE) LIQUID (ML) TOPICAL DAILY
Refills: 0 | Status: DISCONTINUED | COMMUNITY
End: 2024-04-12

## 2024-04-12 RX ORDER — CYANOCOBALAMIN (VITAMIN B-12) 1000 MCG
TABLET ORAL DAILY
Refills: 0 | Status: DISCONTINUED | COMMUNITY
End: 2024-04-12

## 2024-04-12 RX ORDER — MECLIZINE HYDROCHLORIDE 25 MG/1
TABLET ORAL
Refills: 0 | Status: DISCONTINUED | COMMUNITY
End: 2024-04-12

## 2024-04-12 RX ORDER — CHLORHEXIDINE GLUCONATE 4 %
325 (65 FE) LIQUID (ML) TOPICAL
Refills: 0 | Status: ACTIVE | COMMUNITY

## 2024-04-12 RX ORDER — PNV NO.95/FERROUS FUM/FOLIC AC 28MG-0.8MG
TABLET ORAL
Refills: 0 | Status: ACTIVE | COMMUNITY

## 2024-04-12 RX ORDER — OMEPRAZOLE 40 MG/1
40 CAPSULE, DELAYED RELEASE ORAL EVERY MORNING
Refills: 0 | Status: ACTIVE | COMMUNITY

## 2024-04-16 ENCOUNTER — NON-APPOINTMENT (OUTPATIENT)
Age: 74
End: 2024-04-16

## 2024-04-16 ENCOUNTER — APPOINTMENT (OUTPATIENT)
Dept: HEART AND VASCULAR | Facility: CLINIC | Age: 74
End: 2024-04-16
Payer: MEDICARE

## 2024-04-16 VITALS
DIASTOLIC BLOOD PRESSURE: 60 MMHG | SYSTOLIC BLOOD PRESSURE: 108 MMHG | WEIGHT: 200 LBS | BODY MASS INDEX: 35.44 KG/M2 | OXYGEN SATURATION: 98 % | TEMPERATURE: 96.1 F | HEIGHT: 63 IN | HEART RATE: 70 BPM | RESPIRATION RATE: 16 BRPM

## 2024-04-16 DIAGNOSIS — R06.09 OTHER FORMS OF DYSPNEA: ICD-10-CM

## 2024-04-16 PROCEDURE — 93000 ELECTROCARDIOGRAM COMPLETE: CPT

## 2024-04-16 PROCEDURE — 99214 OFFICE O/P EST MOD 30 MIN: CPT

## 2024-04-16 PROCEDURE — G2211 COMPLEX E/M VISIT ADD ON: CPT

## 2024-04-16 RX ORDER — GABAPENTIN 300 MG/1
300 CAPSULE ORAL 3 TIMES DAILY
Refills: 0 | Status: ACTIVE | COMMUNITY

## 2024-04-16 NOTE — REASON FOR VISIT
[Follow-Up - Clinic] : a clinic follow-up of [FreeTextEntry1] : 73 year old F with history of HTN, HL, history of benign Schwannoma, prediabetes reported history of MI (2012) as per patient with 4 stents in [place] here, last seen in October 2021. She returns today for preoperative cardiac risk assessment prior to TKR. She denies chest pain, however get dyspneic upon exertion. Walks with a walker. No orthopnea, PND. + edema bilaterally.   EKG today SR at 69 bpm with NSST in leads I, AVF.  EKG 10/2021 SR at 72 bpm. without STT abnormalities.   Holter Oct/Nov 2020: SR at average HR of 69 bpm, Rare PVC/APC. One episode of sinus tachycardia at 11:09 pm on 11/4/2020  EKG 10/24/2020 shows SR with normal QTC (<400 msec).   Holter 2 week - Rare PVC/APC, Average HR 66 bpm SR. 9 beat SVT run - 1 time.  Labs 01/15/2020: ; ; HDL 38; Total chol 178; CMP and CBC WNL  DSE 12/31/2019: No EKG changes, Occasional PVCs, Normal stress echo Resting echo Trace MR; LVEF 55%; Trace TR, PASP 10 mm Hg. Mild cLVH (1.4 cm).   EKG 12/2019 NSR at 63 bpm without significant STT abnormalities.

## 2024-04-16 NOTE — DISCUSSION/SUMMARY
[Coronary Artery Disease] : coronary artery disease [None] : There are no changes in medication management [Hyperlipidemia] : hyperlipidemia [Stable] : stable [Diet Modification] : diet modification [Exercise] : exercise [Hypertension] : hypertension [Responding to Treatment] : responding to treatment [___ Month(s)] : in [unfilled] month(s) [EKG obtained to assist in diagnosis and management of assessed problem(s)] : EKG obtained to assist in diagnosis and management of assessed problem(s) [de-identified] : c/w ASA and Atorvastatin [FreeTextEntry1] : check routine labs [de-identified] : normal [de-identified] : resolved [FreeTextEntry4] : CARLIN - needs EXSE/TTE.

## 2024-04-17 LAB
ALBUMIN SERPL ELPH-MCNC: 4.1 G/DL
ALP BLD-CCNC: 171 U/L
ALT SERPL-CCNC: 13 U/L
ANION GAP SERPL CALC-SCNC: 12 MMOL/L
AST SERPL-CCNC: 20 U/L
BILIRUB SERPL-MCNC: 0.3 MG/DL
BUN SERPL-MCNC: 16 MG/DL
CALCIUM SERPL-MCNC: 8.8 MG/DL
CHLORIDE SERPL-SCNC: 107 MMOL/L
CHOLEST SERPL-MCNC: 121 MG/DL
CO2 SERPL-SCNC: 23 MMOL/L
CREAT SERPL-MCNC: 0.94 MG/DL
EGFR: 63 ML/MIN/1.73M2
ESTIMATED AVERAGE GLUCOSE: 126 MG/DL
GLUCOSE SERPL-MCNC: 123 MG/DL
HBA1C MFR BLD HPLC: 6 %
HCT VFR BLD CALC: 37.6 %
HDLC SERPL-MCNC: 44 MG/DL
HGB BLD-MCNC: 11.3 G/DL
LDLC SERPL CALC-MCNC: 51 MG/DL
MCHC RBC-ENTMCNC: 27.4 PG
MCHC RBC-ENTMCNC: 30.1 GM/DL
MCV RBC AUTO: 91.3 FL
NONHDLC SERPL-MCNC: 76 MG/DL
PLATELET # BLD AUTO: 329 K/UL
POTASSIUM SERPL-SCNC: 4.3 MMOL/L
PROT SERPL-MCNC: 7.4 G/DL
RBC # BLD: 4.12 M/UL
RBC # FLD: 15.4 %
SODIUM SERPL-SCNC: 142 MMOL/L
TRIGL SERPL-MCNC: 147 MG/DL
WBC # FLD AUTO: 8.37 K/UL

## 2024-04-18 ENCOUNTER — NON-APPOINTMENT (OUTPATIENT)
Age: 74
End: 2024-04-18

## 2024-04-24 ENCOUNTER — RESULT REVIEW (OUTPATIENT)
Age: 74
End: 2024-04-24

## 2024-05-02 ENCOUNTER — APPOINTMENT (OUTPATIENT)
Dept: HEART AND VASCULAR | Facility: CLINIC | Age: 74
End: 2024-05-02
Payer: MEDICARE

## 2024-05-02 ENCOUNTER — APPOINTMENT (OUTPATIENT)
Dept: HEART AND VASCULAR | Facility: CLINIC | Age: 74
End: 2024-05-02

## 2024-05-02 PROCEDURE — 93306 TTE W/DOPPLER COMPLETE: CPT

## 2024-05-06 ENCOUNTER — NON-APPOINTMENT (OUTPATIENT)
Age: 74
End: 2024-05-06

## 2024-05-13 ENCOUNTER — NON-APPOINTMENT (OUTPATIENT)
Age: 74
End: 2024-05-13

## 2024-10-28 ENCOUNTER — RESULT REVIEW (OUTPATIENT)
Age: 74
End: 2024-10-28

## 2024-10-28 ENCOUNTER — APPOINTMENT (OUTPATIENT)
Facility: CLINIC | Age: 74
End: 2024-10-28
Payer: MEDICARE

## 2024-10-28 ENCOUNTER — TRANSCRIPTION ENCOUNTER (OUTPATIENT)
Age: 74
End: 2024-10-28

## 2024-10-28 VITALS — HEIGHT: 63 IN | BODY MASS INDEX: 37.21 KG/M2 | WEIGHT: 210 LBS

## 2024-10-28 DIAGNOSIS — M54.16 RADICULOPATHY, LUMBAR REGION: ICD-10-CM

## 2024-10-28 PROCEDURE — 99204 OFFICE O/P NEW MOD 45 MIN: CPT

## 2024-10-28 PROCEDURE — G2211 COMPLEX E/M VISIT ADD ON: CPT

## 2024-10-28 RX ORDER — MELOXICAM 7.5 MG/1
7.5 TABLET ORAL DAILY
Qty: 20 | Refills: 0 | Status: ACTIVE | COMMUNITY
Start: 2024-10-28 | End: 1900-01-01

## 2024-10-28 RX ORDER — CYCLOBENZAPRINE HYDROCHLORIDE 5 MG/1
5 TABLET, FILM COATED ORAL
Qty: 30 | Refills: 0 | Status: ACTIVE | COMMUNITY
Start: 2024-10-28 | End: 1900-01-01

## 2024-10-30 ENCOUNTER — RESULT REVIEW (OUTPATIENT)
Age: 74
End: 2024-10-30

## 2024-11-25 ENCOUNTER — APPOINTMENT (OUTPATIENT)
Dept: ORTHOPEDIC SURGERY | Facility: CLINIC | Age: 74
End: 2024-11-25
Payer: MEDICARE

## 2024-11-25 VITALS
HEIGHT: 63 IN | OXYGEN SATURATION: 98 % | DIASTOLIC BLOOD PRESSURE: 60 MMHG | SYSTOLIC BLOOD PRESSURE: 108 MMHG | RESPIRATION RATE: 16 BRPM | WEIGHT: 210 LBS | BODY MASS INDEX: 37.21 KG/M2 | HEART RATE: 70 BPM

## 2024-11-25 DIAGNOSIS — M54.16 RADICULOPATHY, LUMBAR REGION: ICD-10-CM

## 2024-11-25 PROCEDURE — G2211 COMPLEX E/M VISIT ADD ON: CPT

## 2024-11-25 PROCEDURE — 99214 OFFICE O/P EST MOD 30 MIN: CPT

## 2024-11-25 RX ORDER — TRAMADOL HYDROCHLORIDE 50 MG/1
50 TABLET, COATED ORAL EVERY 6 HOURS
Qty: 20 | Refills: 0 | Status: ACTIVE | COMMUNITY
Start: 2024-11-25 | End: 1900-01-01

## 2024-12-10 ENCOUNTER — APPOINTMENT (OUTPATIENT)
Dept: PAIN MANAGEMENT | Facility: CLINIC | Age: 74
End: 2024-12-10
Payer: MEDICARE

## 2024-12-10 DIAGNOSIS — M54.51 VERTEBROGENIC LOW BACK PAIN: ICD-10-CM

## 2024-12-10 DIAGNOSIS — M54.16 RADICULOPATHY, LUMBAR REGION: ICD-10-CM

## 2024-12-10 PROCEDURE — 99203 OFFICE O/P NEW LOW 30 MIN: CPT

## 2024-12-18 ENCOUNTER — RX RENEWAL (OUTPATIENT)
Age: 74
End: 2024-12-18

## 2025-01-16 ENCOUNTER — TRANSCRIPTION ENCOUNTER (OUTPATIENT)
Age: 75
End: 2025-01-16

## 2025-01-16 ENCOUNTER — APPOINTMENT (OUTPATIENT)
Dept: PAIN MANAGEMENT | Facility: HOSPITAL | Age: 75
End: 2025-01-16

## 2025-01-27 ENCOUNTER — APPOINTMENT (OUTPATIENT)
Dept: ORTHOPEDIC SURGERY | Facility: CLINIC | Age: 75
End: 2025-01-27

## 2025-06-05 ENCOUNTER — APPOINTMENT (OUTPATIENT)
Dept: GERIATRICS | Facility: CLINIC | Age: 75
End: 2025-06-05

## 2025-06-05 VITALS
DIASTOLIC BLOOD PRESSURE: 70 MMHG | OXYGEN SATURATION: 97 % | HEIGHT: 59 IN | WEIGHT: 198 LBS | SYSTOLIC BLOOD PRESSURE: 130 MMHG | HEART RATE: 69 BPM | BODY MASS INDEX: 39.92 KG/M2

## 2025-06-05 PROBLEM — N39.0 CHRONIC UTI: Status: ACTIVE | Noted: 2025-06-05

## 2025-06-05 PROCEDURE — 99205 OFFICE O/P NEW HI 60 MIN: CPT

## 2025-06-05 RX ORDER — METHENAMINE HIPPURATE 1 G/1
1 TABLET ORAL TWICE DAILY
Qty: 180 | Refills: 2 | Status: ACTIVE | COMMUNITY
Start: 2025-06-05 | End: 1900-01-01

## 2025-06-13 PROBLEM — K59.09 CHRONIC CONSTIPATION: Status: ACTIVE | Noted: 2025-06-13

## 2025-06-13 PROBLEM — F33.9 DEPRESSION, RECURRENT: Status: ACTIVE | Noted: 2025-06-13

## 2025-07-03 ENCOUNTER — NON-APPOINTMENT (OUTPATIENT)
Age: 75
End: 2025-07-03

## 2025-08-28 ENCOUNTER — APPOINTMENT (OUTPATIENT)
Dept: HEART AND VASCULAR | Facility: CLINIC | Age: 75
End: 2025-08-28

## 2025-09-11 ENCOUNTER — APPOINTMENT (OUTPATIENT)
Dept: GERIATRICS | Facility: CLINIC | Age: 75
End: 2025-09-11

## 2025-09-12 ENCOUNTER — NON-APPOINTMENT (OUTPATIENT)
Age: 75
End: 2025-09-12